# Patient Record
Sex: FEMALE | URBAN - METROPOLITAN AREA
[De-identification: names, ages, dates, MRNs, and addresses within clinical notes are randomized per-mention and may not be internally consistent; named-entity substitution may affect disease eponyms.]

---

## 2023-03-09 ENCOUNTER — APPOINTMENT (RX ONLY)
Dept: URBAN - METROPOLITAN AREA CLINIC 12 | Facility: CLINIC | Age: 43
Setting detail: DERMATOLOGY
End: 2023-03-09

## 2023-03-09 DIAGNOSIS — Z41.9 ENCOUNTER FOR PROCEDURE FOR PURPOSES OTHER THAN REMEDYING HEALTH STATE, UNSPECIFIED: ICD-10-CM

## 2023-03-09 PROCEDURE — ? FACIAL

## 2023-03-09 ASSESSMENT — LOCATION ZONE DERM: LOCATION ZONE: FACE

## 2023-03-09 ASSESSMENT — LOCATION SIMPLE DESCRIPTION DERM: LOCATION SIMPLE: LEFT FOREHEAD

## 2023-03-09 ASSESSMENT — LOCATION DETAILED DESCRIPTION DERM: LOCATION DETAILED: LEFT FOREHEAD

## 2023-03-09 NOTE — PROCEDURE: FACIAL
Exfoliation Type: glycolic exfoliant
Assessment (Optional): Acne
Treatment Serum (Optional): PhytoCorrect
Price (Use Numbers Only, No Special Characters Or $): 0.00
Treatment Type (Optional): Anti Aging Facial
Mask Type (Optional): calming
Extraction Method: extractor
Detail Level: Generalized
High Frequency Facial (Optional): neon (orange)
Comments (Sticky): Partner facial, free because of referrals, owns cord and iron, acne and dryness, dehydrated acne facial
Cryo Stick Massage (Optional): yes
Led Light (Optional): blue